# Patient Record
Sex: MALE | URBAN - METROPOLITAN AREA
[De-identification: names, ages, dates, MRNs, and addresses within clinical notes are randomized per-mention and may not be internally consistent; named-entity substitution may affect disease eponyms.]

---

## 2020-05-05 ENCOUNTER — APPOINTMENT (OUTPATIENT)
Dept: URBAN - METROPOLITAN AREA CLINIC 271 | Age: 55
Setting detail: DERMATOLOGY
End: 2020-05-05

## 2020-05-05 DIAGNOSIS — L90.5 SCAR CONDITIONS AND FIBROSIS OF SKIN: ICD-10-CM

## 2020-05-05 DIAGNOSIS — L57.0 ACTINIC KERATOSIS: ICD-10-CM

## 2020-05-05 PROCEDURE — 99201: CPT | Mod: 25

## 2020-05-05 PROCEDURE — OTHER REASSURANCE: OTHER

## 2020-05-05 PROCEDURE — OTHER LIQUID NITROGEN: OTHER

## 2020-05-05 PROCEDURE — 17000 DESTRUCT PREMALG LESION: CPT

## 2020-05-05 PROCEDURE — OTHER COUNSELING: OTHER

## 2020-05-05 ASSESSMENT — TOTAL NUMBER OF LESIONS: # OF LESIONS?: 1

## 2020-05-05 ASSESSMENT — LOCATION SIMPLE DESCRIPTION DERM: LOCATION SIMPLE: SCALP

## 2020-05-05 ASSESSMENT — LOCATION ZONE DERM: LOCATION ZONE: SCALP

## 2020-05-05 ASSESSMENT — LOCATION DETAILED DESCRIPTION DERM: LOCATION DETAILED: RIGHT SUPERIOR PARIETAL SCALP

## 2020-05-05 NOTE — PROCEDURE: REASSURANCE
Additional Notes (Optional): Patient had mole removed off back of neck and wanted to make sure the scar looked appropriate. Area healed well.
Hide Additional Notes?: No
Detail Level: Detailed